# Patient Record
Sex: FEMALE | Race: WHITE | NOT HISPANIC OR LATINO
[De-identification: names, ages, dates, MRNs, and addresses within clinical notes are randomized per-mention and may not be internally consistent; named-entity substitution may affect disease eponyms.]

---

## 2019-03-27 ENCOUNTER — RECORDS - HEALTHEAST (OUTPATIENT)
Dept: ADMINISTRATIVE | Facility: OTHER | Age: 59
End: 2019-03-27

## 2019-09-19 ENCOUNTER — COMMUNICATION - HEALTHEAST (OUTPATIENT)
Dept: TELEHEALTH | Facility: CLINIC | Age: 59
End: 2019-09-19

## 2019-09-19 ENCOUNTER — OFFICE VISIT - HEALTHEAST (OUTPATIENT)
Dept: FAMILY MEDICINE | Facility: CLINIC | Age: 59
End: 2019-09-19

## 2019-09-19 DIAGNOSIS — I10 ESSENTIAL HYPERTENSION: ICD-10-CM

## 2019-09-19 DIAGNOSIS — I05.9 MITRAL VALVE DISORDER: ICD-10-CM

## 2019-09-19 DIAGNOSIS — G47.00 INSOMNIA, UNSPECIFIED TYPE: ICD-10-CM

## 2019-09-19 DIAGNOSIS — H93.8X1 EAR FULLNESS, RIGHT: ICD-10-CM

## 2019-09-19 DIAGNOSIS — Z90.710 H/O: HYSTERECTOMY: ICD-10-CM

## 2019-09-19 DIAGNOSIS — Z76.89 ENCOUNTER TO ESTABLISH CARE: ICD-10-CM

## 2019-09-19 DIAGNOSIS — E03.9 HYPOTHYROIDISM, UNSPECIFIED TYPE: ICD-10-CM

## 2019-09-19 DIAGNOSIS — Z12.31 VISIT FOR SCREENING MAMMOGRAM: ICD-10-CM

## 2019-09-19 DIAGNOSIS — F40.243 FEAR OF FLYING: ICD-10-CM

## 2019-09-19 RX ORDER — LORAZEPAM 0.5 MG/1
TABLET ORAL
Refills: 0 | Status: SHIPPED | COMMUNITY
Start: 2019-04-18

## 2019-09-19 RX ORDER — LISINOPRIL 5 MG/1
TABLET ORAL
Refills: 0 | Status: SHIPPED | COMMUNITY
Start: 2019-08-20

## 2019-09-19 RX ORDER — ROSUVASTATIN CALCIUM 5 MG/1
TABLET, COATED ORAL
Refills: 2 | Status: SHIPPED | COMMUNITY
Start: 2019-08-27

## 2019-09-19 ASSESSMENT — MIFFLIN-ST. JEOR: SCORE: 1339.25

## 2021-06-01 NOTE — PROGRESS NOTES
"Assessment/plan   Raquel Cobian is a 58 y.o. female who is new to my practice.    Raquel was seen today for ear fullness and establish care.    Diagnoses and all orders for this visit:    Encounter to establish care    Hypothyroidism, unspecified type  TSH normal 3/2019, not yet due for recheck.  Continues on Synthroid 88 mcg.     Ear fullness, right  TM normal in appearance today without cerumen nor fluid/effusion. Discussed possibility of eustachian tube dysfunction.  Recommended continued conservative management with Advil for comfort and Flonase daily for 2 weeks.  Return for reassessment if pain or fever develops.    Essential hypertension  Sounds like she was diagnosed with mildly elevated blood pressures March 2019 in the setting of family stressors.  Was started on lisinopril 5 mg and tolerating well.  She was counseled how to consider a trial off of this and monitor blood pressures.    Insomnia, unspecified type  Stable, well controlled with amitriptyline 75 mg daily for the past 30 years.    Mitral valve disorder  Reviewed her echo from 2013, was due to follow-up in 5 years so we will place this order now.  She does not have any syncopal episodes or dyspnea on exertion, but feels there is more \"full numbness to the heartbeats when she is lying down or exercising.  -     Echo Complete; Future    Fear of flying.    reviewed showed fill for 5 tablets of 1 mg Ativan in March 2019, and 20 tablets of 0.5 mg Ativan in April-in the setting of acute family stressors.  She denies refill request at this time.    Visit for screening mammogram  -     Mammo Screening Bilateral; Future      Follow up:  Annual physical when due    Prema Belle MD    Subjective:      HPI: Raquel Cobian is a 58 y.o. female who is here for:    Chief Complaint   Patient presents with     Ear Fullness     right ear     Establish Care     has noticed her mitral valve leak more lately. would like to follow up for that- does " "not follow cardiology       Ear fullness: since last Friday, about 6 days ago. Does get better through the day.No pain. Mild congestion, no fevers or cough.  No recent illness.  No recent flying    Hypothyroidism: TSH 1.57, stable dose of synthroid 88mcg.    HTN: Started on lisinopril in March 2019 due to stress and elevated BPs. Doing 5mg of this at bedtime. Thinks she may want to trial back off. Was able to skip a bedtime dose and next am BP was 130/80s.     Mitral valve disorder: Has h/o mitral valve leak, feels like she notices this more often. When she is physically active or laying down, she notices a \"stronger heartbeat\", no fast beats, no palpitations.     HLD: restarted her rousuvatatin in March due to LDL of 204, better now at 89 from 6/2019 lab recheck. Had muscle aches and ankle stiffness with atorvastatin.     Insomnia: takes 75mg amitriptyline (3 tabs of 25mg), been on this for 30 years.     Has a fear of flying.  She was given Ativan for this over the last 2 years.   reviewed showed fill for 5 tablets of 1 mg Ativan in March 2019, and 20 tablets of 0.5 mg Ativan in April-in the setting of acute family stressors.  She denies refill request at this time.    Plans to do mammograms annually- famhx with breast cancer-and her niece at age 30.  HER-2 sisters with breast cancer at age 68 and 67.  1 of the sisters did have genetic work-up and this was negative.  Patient declines genetic referral.    Colonoscopy not yet due.  This was done and found a hyperplastic polyp in 2016, repeat in 5 years.    Had h/o surgery on inner thigh from a MRSA infection. Thinks she caught this from caring for her father's diabetic and MRSA infections on his legs.  H/o hysterectomy 4/2014.     Review of Systems:  12 point comprehensive review of systems was negative except as noted and HPI     Social History:  Social History     Social History Narrative    , grown son is in his 30s.    [place] family [place].     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     Walks twice daily.     Nonsmoker, social alcohol.    Prema Belle MD        .       Medical History:  Patient Active Problem List   Diagnosis     Benign Pigmented Nevus     Osteoarthritis, hand     Osteoarthritis of carpometacarpal joint of thumb     Colon polyps     Hyperlipidemia     Hypothyroidism     Insomnia, unspecified     Mitral valve disorder     MRSA (methicillin resistant staph aureus) culture positive     Prediabetes     Phobia     Essential hypertension     No past medical history on file.  No past surgical history on file.  Codeine and Percocet [oxycodone-acetaminophen]  Family History   Problem Relation Age of Onset     Hypertension Mother      Heart disease Father        Medications:  Current Outpatient Medications   Medication Sig Dispense Refill     amitriptyline (ELAVIL) 75 MG tablet Take 75 mg by mouth bedtime.       levothyroxine (SYNTHROID) 88 MCG tablet Take 88 mcg by mouth daily.       lisinopril (PRINIVIL,ZESTRIL) 5 MG tablet TK 2 TS PO QD  0     rosuvastatin (CRESTOR) 5 MG tablet TK 1 T PO HS  2     LORazepam (ATIVAN) 0.5 MG tablet TK 1 T PO TID PRN  0     No current facility-administered medications for this visit.        Imaging & Labs reviewed this visit:     ECHO COMPLETE WO CONTRAST BEV LUIS 2013 13:46     [facility] [address], [place] [address]   Main: [phone] [email or web address]     Transthoracic Echo Report   JANELLE, BEV Jimian ID: [id number] Age: 52 : 1960 Ordering Provider: CB CUMMINGS   Exam Date: 2013 13:46 Gender: F Sonographer: SADIE   Accession #: [id number] Height: 66 in BSA: 1.81 m  BP: 133 / 78   Weight: 159 lbs BMI: 25.7 kg/m    Location: [facility]   Procedure: ECHO COMPLETE WO CONTRAST   Indications: Mitral regurgitation   Technical Quality: Adequate Contrast: None     Final Conclusion   Normal left  "ventricular size. Normal left ventricular ejection fraction estimated at 55%.   Normal left ventricular filling pressures (E/E' < 8).   Mitral valve thickened, borderline prolapse. Trace mitral regurgitation.      Objective:      Vitals:    09/19/19 0743   BP: 112/72   Pulse: 80   Weight: 171 lb 9.6 oz (77.8 kg)   Height: 5' 5\" (1.651 m)       Physical Exam:     General: Alert, no acute distress.   HEENT: normocephalic conjunctivae are clear, Normal pearly TMs bilaterally without erythema, pus or fluid. Position and landmarks are normal.  Nose is clear.  Oropharynx is moist and clear, without tonsillar hypertrophy, asymmetry, exudate or lesions.   Neck: supple without adenopathy or thyromegaly.  Lungs: Good aeration bilaterally. Clear to auscultation without wheezes, rales or rhonci.   Heart: regular rate and rhythm, normal S1 and S2, no murmurs  Abdomen: soft and nontender  Skin: clear without rash or lesions  Neuro: alert, interactive moving all extremities equally    Prema Belle MD        "

## 2021-06-03 VITALS
SYSTOLIC BLOOD PRESSURE: 112 MMHG | HEIGHT: 65 IN | DIASTOLIC BLOOD PRESSURE: 72 MMHG | HEART RATE: 80 BPM | WEIGHT: 171.6 LBS | BODY MASS INDEX: 28.59 KG/M2

## 2021-06-16 PROBLEM — I10 ESSENTIAL HYPERTENSION: Status: ACTIVE | Noted: 2019-09-19

## 2021-06-16 PROBLEM — Z90.710 H/O: HYSTERECTOMY: Status: ACTIVE | Noted: 2019-09-19
